# Patient Record
Sex: FEMALE | Race: WHITE | Employment: OTHER | URBAN - METROPOLITAN AREA
[De-identification: names, ages, dates, MRNs, and addresses within clinical notes are randomized per-mention and may not be internally consistent; named-entity substitution may affect disease eponyms.]

---

## 2018-08-23 ENCOUNTER — HOSPITAL ENCOUNTER (EMERGENCY)
Age: 68
Discharge: HOME | End: 2018-08-23
Attending: EMERGENCY MEDICINE
Payer: MEDICARE

## 2018-08-23 VITALS
BODY MASS INDEX: 28.63 KG/M2 | HEIGHT: 59 IN | RESPIRATION RATE: 16 BRPM | DIASTOLIC BLOOD PRESSURE: 76 MMHG | HEART RATE: 68 BPM | WEIGHT: 142 LBS | SYSTOLIC BLOOD PRESSURE: 143 MMHG | OXYGEN SATURATION: 96 % | TEMPERATURE: 97.2 F

## 2018-08-23 DIAGNOSIS — S29.012A STRAIN OF MID-BACK, INITIAL ENCOUNTER: ICD-10-CM

## 2018-08-23 DIAGNOSIS — N30.00 ACUTE CYSTITIS WITHOUT HEMATURIA: Primary | ICD-10-CM

## 2018-08-23 LAB
APPEARANCE UR: CLEAR
BACTERIA URNS QL MICRO: ABNORMAL /HPF
BILIRUB UR QL: NEGATIVE
COLOR UR: YELLOW
EPITH CASTS URNS QL MICRO: ABNORMAL /LPF (ref 0–5)
GLUCOSE UR STRIP.AUTO-MCNC: NEGATIVE MG/DL
HGB UR QL STRIP: NEGATIVE
KETONES UR QL STRIP.AUTO: ABNORMAL MG/DL
LEUKOCYTE ESTERASE UR QL STRIP.AUTO: ABNORMAL
NITRITE UR QL STRIP.AUTO: NEGATIVE
PH UR STRIP: 5.5 [PH] (ref 5–8)
PROT UR STRIP-MCNC: NEGATIVE MG/DL
RBC #/AREA URNS HPF: ABNORMAL /HPF (ref 0–5)
SP GR UR REFRACTOMETRY: 1.02 (ref 1–1.03)
UROBILINOGEN UR QL STRIP.AUTO: 0.2 EU/DL (ref 0.2–1)
WBC URNS QL MICRO: ABNORMAL /HPF (ref 0–4)

## 2018-08-23 PROCEDURE — 81001 URINALYSIS AUTO W/SCOPE: CPT | Performed by: EMERGENCY MEDICINE

## 2018-08-23 PROCEDURE — 99283 EMERGENCY DEPT VISIT LOW MDM: CPT

## 2018-08-23 RX ORDER — NITROFURANTOIN 25; 75 MG/1; MG/1
100 CAPSULE ORAL 2 TIMES DAILY
Qty: 10 CAP | Refills: 0 | Status: SHIPPED | OUTPATIENT
Start: 2018-08-23 | End: 2018-08-28

## 2018-08-23 RX ORDER — METHOCARBAMOL 500 MG/1
500 TABLET, FILM COATED ORAL
Qty: 12 TAB | Refills: 0 | Status: SHIPPED | OUTPATIENT
Start: 2018-08-23

## 2018-08-23 NOTE — ED PROVIDER NOTES
EMERGENCY DEPARTMENT HISTORY AND PHYSICAL EXAM    8:50 AM      Date: 8/23/2018  Patient Name: Alejandrina Zamora    History of Presenting Illness     Chief Complaint   Patient presents with    Back Pain         History Provided By: Patient    Chief Complaint: Back pain   Duration: 5 Days  Timing:  Constant  Location: mid back bilateral   Quality: Aching  Severity: Moderate  Modifying Factors: 2 Tylenol and Naproxen, with relief of Sx; exacerbated by standing and bending over trying to stand    Associated Symptoms: decreased urination       Additional History (Context): Alejandrina Zamora is a 77 y.o. female with PMHx of HTN and depression who presents with c/o moderate constant aching bilateral mid back pain that started 5 days ago with associated Sx of decreased urination. Pt states she is here visiting from [place], she has been here for 1 week and traveled here by train. Patient states she will be here visiting for another 2 weeks. She states she feels like she strained her back and when she feels a lot of pain she described the back pain like a toothache. Pt's pain is exacerbated by standing and bending over trying to stand up straight. Pt's has no back pain at rest and lying down flat. Pt took 2 Tylenol pills and a Naproxen this morning for her pain and she had some relief but she states she will only have relief until the medication wears off. Pt has a hx of a hip replacement surgery, infection in the hip, and MRSA. Pt states she is a previous tobacco user and quit smoking about 9 years ago and because of that she has constant SOB on exertion. Pt is worried her Sx may be because of a bladder infection. Pt's PCP in [place] is Dr. Rosendo Ellington. Pt denies injury or fall that may have caused the back pain. Pt denies dysuria, cloudy urine, numbness, trauma, abdominal pain, nausea, and vomiting. Denies any further complaints or symptoms at the moment.        PCP: Xochitl Yanez MD      Past History     Past Medical History:  Past Medical History:   Diagnosis Date    Depression     Hypertension        Past Surgical History:  History reviewed. No pertinent surgical history. Family History:  History reviewed. No pertinent family history. Social History:  Social History   Substance Use Topics    Smoking status: Former Smoker    Smokeless tobacco: Never Used    Alcohol use No       Allergies: Allergies   Allergen Reactions    Amoxicillin Hives    Codeine Nausea and Vomiting    Vancomycin Itching         Review of Systems       Review of Systems   Constitutional: Negative for fever. HENT: Negative for sore throat. Eyes: Negative for redness. Respiratory: Negative for shortness of breath and wheezing. Cardiovascular: Negative for chest pain. Gastrointestinal: Negative for abdominal pain, nausea and vomiting. Genitourinary: Positive for decreased urine volume. Negative for dysuria and hematuria. Negative for cloudy urine   Musculoskeletal: Positive for back pain. Negative for neck stiffness. Skin: Negative for pallor. Neurological: Negative for headaches. Hematological: Does not bruise/bleed easily. All other systems reviewed and are negative. Physical Exam     Visit Vitals    /76 (BP 1 Location: Left arm, BP Patient Position: At rest)    Pulse 68    Temp 97.2 °F (36.2 °C)    Resp 16    Ht 4' 11\" (1.499 m)    Wt 64.4 kg (142 lb)    SpO2 96%    BMI 28.68 kg/m2         Physical Exam   Constitutional: She is oriented to person, place, and time. She appears well-developed and well-nourished. No distress. HENT:   Head: Normocephalic and atraumatic. Mouth/Throat: Oropharynx is clear and moist.   Eyes: Conjunctivae are normal. Pupils are equal, round, and reactive to light. No scleral icterus. Neck: Normal range of motion. Neck supple. Cardiovascular: Normal rate and intact distal pulses.     Capillary refill < 3 seconds   Pulmonary/Chest: Effort normal and breath sounds normal. No respiratory distress. She has no wheezes. Abdominal: Soft. Bowel sounds are normal. She exhibits no distension. There is no tenderness. Musculoskeletal: Normal range of motion. She exhibits no edema. When stood up and bent forward a few time pt has pain in the latissmus dorsi area  No flank TTP   Lymphadenopathy:     She has no cervical adenopathy. Neurological: She is alert and oriented to person, place, and time. No cranial nerve deficit. Skin: Skin is warm and dry. She is not diaphoretic. Nursing note and vitals reviewed. Diagnostic Study Results     Labs -  Recent Results (from the past 12 hour(s))   URINALYSIS W/ RFLX MICROSCOPIC    Collection Time: 08/23/18  9:46 AM   Result Value Ref Range    Color YELLOW      Appearance CLEAR      Specific gravity 1.025 1.003 - 1.030      pH (UA) 5.5 5.0 - 8.0      Protein NEGATIVE  NEG mg/dL    Glucose NEGATIVE  NEG mg/dL    Ketone TRACE (A) NEG mg/dL    Bilirubin NEGATIVE  NEG      Blood NEGATIVE  NEG      Urobilinogen 0.2 0.2 - 1.0 EU/dL    Nitrites NEGATIVE  NEG      Leukocyte Esterase SMALL (A) NEG     URINE MICROSCOPIC ONLY    Collection Time: 08/23/18  9:46 AM   Result Value Ref Range    WBC 0 to 1 0 - 4 /hpf    RBC 0 to 1 0 - 5 /hpf    Epithelial cells FEW 0 - 5 /lpf    Bacteria 1+ (A) NEG /hpf       Radiologic Studies -   No orders to display         Medical Decision Making   I am the first provider for this patient. I reviewed the vital signs, available nursing notes, past medical history, past surgical history, family history and social history. Vital Signs-Reviewed the patient's vital signs. Pulse Oximetry Analysis -  96% on room air (Interpretation) normal    Records Reviewed: Nursing Notes (Time of Review: 8:50 AM)    Provider Notes (Medical Decision Making): DDx: back strain, spasm, UTI. No recent trauma, no new SOB, no chest pain, no flank tenderness, pt has decreased urination.  Will check urine, I don't feel any imaging is needed. Pt's pain is resolved at rest and worse when stood up and had certain movements. I doubt a kidney stone. MDM    Medications - No data to display    ED Course: Progress Notes, Reevaluation, and Consults:  +bacteria, small leuks in urine, no rbc. Pt symptomatic, will txt. No current back pain unless move certain way, likely a strain. I have reassessed the patient. I have discussed the workup, results and plan with the patient and patient is in agreement. Patient is feeling better. Patient will be prescribed macrobid, robaxin. Patient was discharge in stable condition. Patient was given outpatient follow up. Patient is to return to emergency department if any new or worsening condition. Diagnosis     Clinical Impression:   1. Acute cystitis without hematuria    2. Strain of mid-back, initial encounter        Disposition: discharged     Follow-up Information     Follow up With Details Comments Contact Info    [facility] EMERGENCY DEPT  As needed, If symptoms worsen [address] [place] [id number] [phone]    Your primary care provider Schedule an appointment as soon as possible for a visit             Patient's Medications   Start Taking    METHOCARBAMOL (ROBAXIN) 500 MG TABLET    Take 1 Tab by mouth two (2) times daily as needed. Indications: pain; muscle spasms    NITROFURANTOIN, MACROCRYSTAL-MONOHYDRATE, (MACROBID) 100 MG CAPSULE    Take 1 Cap by mouth two (2) times a day for 5 days.    Continue Taking    No medications on file   These Medications have changed    No medications on file   Stop Taking    No medications on file     _______________________________    Attestations:  Maite Kelly acting as a scribe for and in the presence of Emmanuel Tobar,       August 23, 2018 at 8:50 AM       Provider Attestation:      I personally performed the services described in the documentation, reviewed the documentation, as recorded by the scribe in my presence, and it accurately and completely records my words and actions.  August 23, 2018 at 8:50 NICOLE - Cralo Harden DO    _______________________________

## 2018-08-23 NOTE — DISCHARGE INSTRUCTIONS
Urinary Tract Infection in Women: Care Instructions  Your Care Instructions    A urinary tract infection, or UTI, is a general term for an infection anywhere between the kidneys and the urethra (where urine comes out). Most UTIs are bladder infections. They often cause pain or burning when you urinate. UTIs are caused by bacteria and can be cured with antibiotics. Be sure to complete your treatment so that the infection goes away. Follow-up care is a key part of your treatment and safety. Be sure to make and go to all appointments, and call your doctor if you are having problems. It's also a good idea to know your test results and keep a list of the medicines you take. How can you care for yourself at home? · Take your antibiotics as directed. Do not stop taking them just because you feel better. You need to take the full course of antibiotics. · Drink extra water and other fluids for the next day or two. This may help wash out the bacteria that are causing the infection. (If you have kidney, heart, or liver disease and have to limit fluids, talk with your doctor before you increase your fluid intake.)  · Avoid drinks that are carbonated or have caffeine. They can irritate the bladder. · Urinate often. Try to empty your bladder each time. · To relieve pain, take a hot bath or lay a heating pad set on low over your lower belly or genital area. Never go to sleep with a heating pad in place. To prevent UTIs  · Drink plenty of water each day. This helps you urinate often, which clears bacteria from your system. (If you have kidney, heart, or liver disease and have to limit fluids, talk with your doctor before you increase your fluid intake.)  · Urinate when you need to. · Urinate right after you have sex. · Change sanitary pads often. · Avoid douches, bubble baths, feminine hygiene sprays, and other feminine hygiene products that have deodorants.   · After going to the bathroom, wipe from front to back.  When should you call for help? Call your doctor now or seek immediate medical care if:    · Symptoms such as fever, chills, nausea, or vomiting get worse or appear for the first time.     · You have new pain in your back just below your rib cage. This is called flank pain.     · There is new blood or pus in your urine.     · You have any problems with your antibiotic medicine.    Watch closely for changes in your health, and be sure to contact your doctor if:    · You are not getting better after taking an antibiotic for 2 days.     · Your symptoms go away but then come back. Where can you learn more? Go to http://carole-rolando.info/. Enter P683 in the search box to learn more about \"Urinary Tract Infection in Women: Care Instructions. \"  Current as of: May 12, 2017  Content Version: 11.7  © 0419-0544 [employer]. Care instructions adapted under license by [employer] (which disclaims liability or warranty for this information). If you have questions about a medical condition or this instruction, always ask your healthcare professional. [address] any warranty or liability for your use of this information.

## 2018-08-23 NOTE — ED TRIAGE NOTES
The patient presents for evaluation of bilateral mid back pain x one week. Denies trauma or heavy lifting.

## 2024-01-14 ENCOUNTER — HOSPITAL ENCOUNTER (EMERGENCY)
Facility: HOSPITAL | Age: 74
Discharge: HOME | End: 2024-01-14
Payer: MEDICARE

## 2024-01-14 ENCOUNTER — APPOINTMENT (OUTPATIENT)
Facility: HOSPITAL | Age: 74
End: 2024-01-14
Payer: MEDICARE

## 2024-01-14 VITALS
RESPIRATION RATE: 18 BRPM | SYSTOLIC BLOOD PRESSURE: 130 MMHG | HEART RATE: 76 BPM | TEMPERATURE: 97.7 F | DIASTOLIC BLOOD PRESSURE: 73 MMHG | OXYGEN SATURATION: 94 %

## 2024-01-14 DIAGNOSIS — R19.7 DIARRHEA, UNSPECIFIED TYPE: ICD-10-CM

## 2024-01-14 DIAGNOSIS — N30.01 ACUTE CYSTITIS WITH HEMATURIA: Primary | ICD-10-CM

## 2024-01-14 LAB
ALBUMIN SERPL-MCNC: 4.4 G/DL (ref 3.4–5)
ALBUMIN/GLOB SERPL: 1 (ref 0.8–1.7)
ALP SERPL-CCNC: 119 U/L (ref 45–117)
ALT SERPL-CCNC: 24 U/L (ref 13–56)
ANION GAP SERPL CALC-SCNC: 5 MMOL/L (ref 3–18)
APPEARANCE UR: ABNORMAL
AST SERPL-CCNC: 28 U/L (ref 10–38)
BACTERIA URNS QL MICRO: ABNORMAL /HPF
BASOPHILS # BLD: 0.1 K/UL (ref 0–0.1)
BASOPHILS NFR BLD: 1 % (ref 0–2)
BILIRUB SERPL-MCNC: 1.4 MG/DL (ref 0.2–1)
BILIRUB UR QL: NEGATIVE
BUN SERPL-MCNC: 15 MG/DL (ref 7–18)
BUN/CREAT SERPL: 14 (ref 12–20)
CALCIUM SERPL-MCNC: 9.9 MG/DL (ref 8.5–10.1)
CHLORIDE SERPL-SCNC: 104 MMOL/L (ref 100–111)
CO2 SERPL-SCNC: 28 MMOL/L (ref 21–32)
COLOR UR: YELLOW
CREAT SERPL-MCNC: 1.09 MG/DL (ref 0.6–1.3)
DIFFERENTIAL METHOD BLD: ABNORMAL
EOSINOPHIL # BLD: 0.1 K/UL (ref 0–0.4)
EOSINOPHIL NFR BLD: 1 % (ref 0–5)
EPITH CASTS URNS QL MICRO: ABNORMAL /LPF (ref 0–5)
ERYTHROCYTE [DISTWIDTH] IN BLOOD BY AUTOMATED COUNT: 12.6 % (ref 11.6–14.5)
GLOBULIN SER CALC-MCNC: 4.4 G/DL (ref 2–4)
GLUCOSE SERPL-MCNC: 94 MG/DL (ref 74–99)
GLUCOSE UR STRIP.AUTO-MCNC: NEGATIVE MG/DL
HCT VFR BLD AUTO: 44.1 % (ref 35–45)
HGB BLD-MCNC: 14.7 G/DL (ref 12–16)
HGB UR QL STRIP: ABNORMAL
IMM GRANULOCYTES # BLD AUTO: 0 K/UL (ref 0–0.04)
IMM GRANULOCYTES NFR BLD AUTO: 0 % (ref 0–0.5)
KETONES UR QL STRIP.AUTO: NEGATIVE MG/DL
LEUKOCYTE ESTERASE UR QL STRIP.AUTO: ABNORMAL
LYMPHOCYTES # BLD: 1.3 K/UL (ref 0.9–3.6)
LYMPHOCYTES NFR BLD: 10 % (ref 21–52)
MCH RBC QN AUTO: 31 PG (ref 24–34)
MCHC RBC AUTO-ENTMCNC: 33.3 G/DL (ref 31–37)
MCV RBC AUTO: 93 FL (ref 78–100)
MONOCYTES # BLD: 0.9 K/UL (ref 0.05–1.2)
MONOCYTES NFR BLD: 7 % (ref 3–10)
NEUTS SEG # BLD: 11 K/UL (ref 1.8–8)
NEUTS SEG NFR BLD: 81 % (ref 40–73)
NITRITE UR QL STRIP.AUTO: POSITIVE
NRBC # BLD: 0 K/UL (ref 0–0.01)
NRBC BLD-RTO: 0 PER 100 WBC
PH UR STRIP: 5.5 (ref 5–8)
PLATELET # BLD AUTO: 210 K/UL (ref 135–420)
PMV BLD AUTO: 9.3 FL (ref 9.2–11.8)
POTASSIUM SERPL-SCNC: 4.2 MMOL/L (ref 3.5–5.5)
PROT SERPL-MCNC: 8.8 G/DL (ref 6.4–8.2)
PROT UR STRIP-MCNC: 30 MG/DL
RBC # BLD AUTO: 4.74 M/UL (ref 4.2–5.3)
RBC #/AREA URNS HPF: ABNORMAL /HPF (ref 0–5)
SODIUM SERPL-SCNC: 137 MMOL/L (ref 136–145)
SP GR UR REFRACTOMETRY: >1.03 (ref 1–1.03)
UROBILINOGEN UR QL STRIP.AUTO: 1 EU/DL (ref 0.2–1)
WBC # BLD AUTO: 13.5 K/UL (ref 4.6–13.2)
WBC URNS QL MICRO: ABNORMAL /HPF (ref 0–4)

## 2024-01-14 PROCEDURE — 85025 COMPLETE CBC W/AUTO DIFF WBC: CPT

## 2024-01-14 PROCEDURE — 81001 URINALYSIS AUTO W/SCOPE: CPT

## 2024-01-14 PROCEDURE — 6360000004 HC RX CONTRAST MEDICATION

## 2024-01-14 PROCEDURE — 87186 SC STD MICRODIL/AGAR DIL: CPT

## 2024-01-14 PROCEDURE — 87086 URINE CULTURE/COLONY COUNT: CPT

## 2024-01-14 PROCEDURE — 99285 EMERGENCY DEPT VISIT HI MDM: CPT

## 2024-01-14 PROCEDURE — 87077 CULTURE AEROBIC IDENTIFY: CPT

## 2024-01-14 PROCEDURE — 74177 CT ABD & PELVIS W/CONTRAST: CPT

## 2024-01-14 PROCEDURE — 80053 COMPREHEN METABOLIC PANEL: CPT

## 2024-01-14 RX ORDER — NITROFURANTOIN 25; 75 MG/1; MG/1
100 CAPSULE ORAL 2 TIMES DAILY
Qty: 14 CAPSULE | Refills: 0 | Status: SHIPPED | OUTPATIENT
Start: 2024-01-14 | End: 2024-01-15

## 2024-01-14 RX ADMIN — IOPAMIDOL 80 ML: 612 INJECTION, SOLUTION INTRAVENOUS at 14:31

## 2024-01-14 NOTE — ED TRIAGE NOTES
Pt arrived in ER complaining of diarrhea and painful urination. Hx of CAD, HTN, and high cholesterol.

## 2024-01-14 NOTE — ED PROVIDER NOTES
Well , 15-17 Years Old  Well-child exams are recommended visits with a health care provider to track your growth and development at certain ages. This sheet tells you what to expect during this visit.  Recommended immunizations  · Tetanus and diphtheria toxoids and acellular pertussis (Tdap) vaccine.  ? Adolescents aged 11-18 years who are not fully immunized with diphtheria and tetanus toxoids and acellular pertussis (DTaP) or have not received a dose of Tdap should:  ? Receive a dose of Tdap vaccine. It does not matter how long ago the last dose of tetanus and diphtheria toxoid-containing vaccine was given.  ? Receive a tetanus diphtheria (Td) vaccine once every 10 years after receiving the Tdap dose.  ? Pregnant adolescents should be given 1 dose of the Tdap vaccine during each pregnancy, between weeks 27 and 36 of pregnancy.  · You may get doses of the following vaccines if needed to catch up on missed doses:  ? Hepatitis B vaccine. Children or teenagers aged 11-15 years may receive a 2-dose series. The second dose in a 2-dose series should be given 4 months after the first dose.  ? Inactivated poliovirus vaccine.  ? Measles, mumps, and rubella (MMR) vaccine.  ? Varicella vaccine.  ? Human papillomavirus (HPV) vaccine.  · You may get doses of the following vaccines if you have certain high-risk conditions:  ? Pneumococcal conjugate (PCV13) vaccine.  ? Pneumococcal polysaccharide (PPSV23) vaccine.  · Influenza vaccine (flu shot). A yearly (annual) flu shot is recommended.  · Hepatitis A vaccine. A teenager who did not receive the vaccine before 2 years of age should be given the vaccine only if he or she is at risk for infection or if hepatitis A protection is desired.  · Meningococcal conjugate vaccine. A booster should be given at 16 years of age.  ? Doses should be given, if needed, to catch up on missed doses. Adolescents aged 11-18 years who have certain high-risk conditions should receive 2  doses. Those doses should be given at least 8 weeks apart.  ? Teens and young adults 16-23 years old may also be vaccinated with a serogroup B meningococcal vaccine.  Testing  Your health care provider may talk with you privately, without parents present, for at least part of the well-child exam. This may help you to become more open about sexual behavior, substance use, risky behaviors, and depression. If any of these areas raises a concern, you may have more testing to make a diagnosis. Talk with your health care provider about the need for certain screenings.  Vision  · Have your vision checked every 2 years, as long as you do not have symptoms of vision problems. Finding and treating eye problems early is important.  · If an eye problem is found, you may need to have an eye exam every year (instead of every 2 years). You may also need to visit an eye specialist.  Hepatitis B  · If you are at high risk for hepatitis B, you should be screened for this virus. You may be at high risk if:  ? You were born in a country where hepatitis B occurs often, especially if you did not receive the hepatitis B vaccine. Talk with your health care provider about which countries are considered high-risk.  ? One or both of your parents was born in a high-risk country and you have not received the hepatitis B vaccine.  ? You have HIV or AIDS (acquired immunodeficiency syndrome).  ? You use needles to inject street drugs.  ? You live with or have sex with someone who has hepatitis B.  ? You are male and you have sex with other males (MSM).  ? You receive hemodialysis treatment.  ? You take certain medicines for conditions like cancer, organ transplantation, or autoimmune conditions.  If you are sexually active:  · You may be screened for certain STDs (sexually transmitted diseases), such as:  ? Chlamydia.  ? Gonorrhea (females only).  ? Syphilis.  · If you are a female, you may also be screened for pregnancy.  If you are  Eosinophils % 1 0 - 5 %    Basophils % 1 0 - 2 %    Immature Granulocytes 0 0.0 - 0.5 %    Neutrophils Absolute 11.0 (H) 1.8 - 8.0 K/UL    Lymphocytes Absolute 1.3 0.9 - 3.6 K/UL    Monocytes Absolute 0.9 0.05 - 1.2 K/UL    Eosinophils Absolute 0.1 0.0 - 0.4 K/UL    Basophils Absolute 0.1 0.0 - 0.1 K/UL    Absolute Immature Granulocyte 0.0 0.00 - 0.04 K/UL    Differential Type AUTOMATED     Comprehensive Metabolic Panel    Collection Time: 01/14/24  1:13 PM   Result Value Ref Range    Sodium 137 136 - 145 mmol/L    Potassium 4.2 3.5 - 5.5 mmol/L    Chloride 104 100 - 111 mmol/L    CO2 28 21 - 32 mmol/L    Anion Gap 5 3.0 - 18 mmol/L    Glucose 94 74 - 99 mg/dL    BUN 15 7.0 - 18 MG/DL    Creatinine 1.09 0.6 - 1.3 MG/DL    Bun/Cre Ratio 14 12 - 20      Est, Glom Filt Rate 53 (L) >60 ml/min/1.73m2    Calcium 9.9 8.5 - 10.1 MG/DL    Total Bilirubin 1.4 (H) 0.2 - 1.0 MG/DL    ALT 24 13 - 56 U/L    AST 28 10 - 38 U/L    Alk Phosphatase 119 (H) 45 - 117 U/L    Total Protein 8.8 (H) 6.4 - 8.2 g/dL    Albumin 4.4 3.4 - 5.0 g/dL    Globulin 4.4 (H) 2.0 - 4.0 g/dL    Albumin/Globulin Ratio 1.0 0.8 - 1.7     Urinalysis    Collection Time: 01/14/24  2:53 PM   Result Value Ref Range    Color, UA YELLOW      Appearance CLOUDY      Specific Gravity, UA >1.030 (H) 1.003 - 1.030    pH, Urine 5.5 5.0 - 8.0      Protein, UA 30 (A) NEG mg/dL    Glucose, UA Negative NEG mg/dL    Ketones, Urine Negative NEG mg/dL    Bilirubin Urine Negative NEG      Blood, Urine LARGE (A) NEG      Urobilinogen, Urine 1.0 0.2 - 1.0 EU/dL    Nitrite, Urine Positive (A) NEG      Leukocyte Esterase, Urine LARGE (A) NEG     Urinalysis, Micro    Collection Time: 01/14/24  2:53 PM   Result Value Ref Range    WBC, UA TOO NUMEROUS TO COUNT 0 - 4 /hpf    RBC, UA 10 to 15 0 - 5 /hpf    Epithelial Cells UA 1+ 0 - 5 /lpf    BACTERIA, URINE 4+ (A) NEG /hpf       Radiologic Studies:   CT ABDOMEN PELVIS W IV CONTRAST Additional Contrast? Radiologist Recommendation  female:  · Your health care provider may ask:  ? Whether you have begun menstruating.  ? The start date of your last menstrual cycle.  ? The typical length of your menstrual cycle.  · Depending on your risk factors, you may be screened for cancer of the lower part of your uterus (cervix).  ? In most cases, you should have your first Pap test when you turn 21 years old. A Pap test, sometimes called a pap smear, is a screening test that is used to check for signs of cancer of the vagina, cervix, and uterus.  ? If you have medical problems that raise your chance of getting cervical cancer, your health care provider may recommend cervical cancer screening before age 21.  Other tests    · You will be screened for:  ? Vision and hearing problems.  ? Alcohol and drug use.  ? High blood pressure.  ? Scoliosis.  ? HIV.  · You should have your blood pressure checked at least once a year.  · Depending on your risk factors, your health care provider may also screen for:  ? Low red blood cell count (anemia).  ? Lead poisoning.  ? Tuberculosis (TB).  ? Depression.  ? High blood sugar (glucose).  · Your health care provider will measure your BMI (body mass index) every year to screen for obesity. BMI is an estimate of body fat and is calculated from your height and weight.  General instructions  Talking with your parents    · Allow your parents to be actively involved in your life. You may start to depend more on your peers for information and support, but your parents can still help you make safe and healthy decisions.  · Talk with your parents about:  ? Body image. Discuss any concerns you have about your weight, your eating habits, or eating disorders.  ? Bullying. If you are being bullied or you feel unsafe, tell your parents or another trusted adult.  ? Handling conflict without physical violence.  ? Dating and sexuality. You should never put yourself in or stay in a situation that makes you feel uncomfortable. If you do not  want to engage in sexual activity, tell your partner no.  ? Your social life and how things are going at school. It is easier for your parents to keep you safe if they know your friends and your friends' parents.  · Follow any rules about curfew and chores in your household.  · If you feel sylvester, depressed, anxious, or if you have problems paying attention, talk with your parents, your health care provider, or another trusted adult. Teenagers are at risk for developing depression or anxiety.  Oral health    · Brush your teeth twice a day and floss daily.  · Get a dental exam twice a year.  Skin care  · If you have acne that causes concern, contact your health care provider.  Sleep  · Get 8.5-9.5 hours of sleep each night. It is common for teenagers to stay up late and have trouble getting up in the morning. Lack of sleep can cause many problems, including difficulty concentrating in class or staying alert while driving.  · To make sure you get enough sleep:  ? Avoid screen time right before bedtime, including watching TV.  ? Practice relaxing nighttime habits, such as reading before bedtime.  ? Avoid caffeine before bedtime.  ? Avoid exercising during the 3 hours before bedtime. However, exercising earlier in the evening can help you sleep better.  What's next?  Visit a pediatrician yearly.  Summary  · Your health care provider may talk with you privately, without parents present, for at least part of the well-child exam.  · To make sure you get enough sleep, avoid screen time and caffeine before bedtime, and exercise more than 3 hours before you go to bed.  · If you have acne that causes concern, contact your health care provider.  · Allow your parents to be actively involved in your life. You may start to depend more on your peers for information and support, but your parents can still help you make safe and healthy decisions.  This information is not intended to replace advice given to you by your health care  provider. Make sure you discuss any questions you have with your health care provider.  Document Released: 03/14/2008 Document Revised: 04/07/2020 Document Reviewed: 07/27/2018  Elsevier Patient Education © 2020 [employer]

## 2024-01-15 ENCOUNTER — HOSPITAL ENCOUNTER (EMERGENCY)
Facility: HOSPITAL | Age: 74
Discharge: HOME | End: 2024-01-15
Payer: MEDICARE

## 2024-01-15 VITALS
SYSTOLIC BLOOD PRESSURE: 127 MMHG | TEMPERATURE: 97.2 F | HEART RATE: 76 BPM | RESPIRATION RATE: 16 BRPM | DIASTOLIC BLOOD PRESSURE: 53 MMHG | OXYGEN SATURATION: 96 % | BODY MASS INDEX: 28.97 KG/M2 | WEIGHT: 138 LBS | HEIGHT: 58 IN

## 2024-01-15 DIAGNOSIS — T50.905A ADVERSE EFFECT OF DRUG, INITIAL ENCOUNTER: Primary | ICD-10-CM

## 2024-01-15 PROCEDURE — 99283 EMERGENCY DEPT VISIT LOW MDM: CPT

## 2024-01-15 PROCEDURE — 6370000000 HC RX 637 (ALT 250 FOR IP)

## 2024-01-15 RX ORDER — CEPHALEXIN 250 MG/1
500 CAPSULE ORAL EVERY 12 HOURS SCHEDULED
Status: DISCONTINUED | OUTPATIENT
Start: 2024-01-15 | End: 2024-01-15 | Stop reason: HOSPADM

## 2024-01-15 RX ORDER — CEPHALEXIN 500 MG/1
500 CAPSULE ORAL 3 TIMES DAILY
Qty: 21 CAPSULE | Refills: 0 | Status: SHIPPED | OUTPATIENT
Start: 2024-01-15 | End: 2024-01-22

## 2024-01-15 RX ORDER — SULFAMETHOXAZOLE AND TRIMETHOPRIM 800; 160 MG/1; MG/1
1 TABLET ORAL EVERY 12 HOURS SCHEDULED
Status: DISCONTINUED | OUTPATIENT
Start: 2024-01-15 | End: 2024-01-15

## 2024-01-15 RX ADMIN — CEPHALEXIN 500 MG: 250 CAPSULE ORAL at 09:20

## 2024-01-15 ASSESSMENT — PAIN - FUNCTIONAL ASSESSMENT: PAIN_FUNCTIONAL_ASSESSMENT: NONE - DENIES PAIN

## 2024-01-15 NOTE — ED PROVIDER NOTES
[facility] EMERGENCY DEPT  EMERGENCY DEPARTMENT ENCOUNTER    Patient Name: Annette Sanchez  MRN: [id number]  YOB: 1950  Provider: Sylvester Marcos DO  PCP: No primary care provider on file.   Time/Date of evaluation: 8:05 AM EST on 1/15/24    History of Presenting Illness     History Provided by: Patient  History is limited by: Nothing     HISTORY:   Annette Sanchez is a 73 y.o. female with past medical history of hypertension, CAD, and hyperlipidemia who was prescribed nitrofurantoin 100mg in ED here yesterday for uncomplicated cystitis and has presented again today with chief complaint of pruritus that began at 0300 after taking medication.  Patient states that pruritus has now resolved but is concerned for allergy to medication.  The patient denies abdominal pain, nausea, throat swelling, cough, wheeze, shortness of breath, rash, or other associated symptoms.  Notes further history of allergic reactions to penicillins (anaphylaxis), vancomycin (pruritus), codeine (pruritus).    She does disclose improving diarrhea, which she clarifies is 2-3 episodes of \"loose\" stools that were not secondary to antibiotic medication, as diarrhea has been present for 1 week following stress test. She reports previous history of similar symptoms following a prior stress test as well.     Nursing Notes were all reviewed and agreed with or any disagreements were addressed in the HPI.    Past History     PAST MEDICAL HISTORY:  Hypertension  CAD  Hyperlipidemia    PAST SURGICAL HISTORY:  Cholecystectomy    FAMILY HISTORY:  Not disclosed    SOCIAL HISTORY:       MEDICATIONS:  Current Facility-Administered Medications   Medication Dose Route Frequency Provider Last Rate Last Admin    sulfamethoxazole-trimethoprim (BACTRIM DS;SEPTRA DS) 800-160 MG per tablet 1 tablet  1 tablet Oral 2 times per day Sylvester Marcos DO        cephALEXin (KEFLEX) capsule 500 mg  500 mg Oral 2 times per day Sylvester Marcos DO         No current

## 2024-01-15 NOTE — ED TRIAGE NOTES
Pt ambulatory to triage c/o itching and diarrhea after being prescribe macrobid for UTI. Pt would like a different medication to treat her UTI. Pt was here yesterday and treated.

## 2024-01-16 LAB
BACTERIA SPEC CULT: ABNORMAL
CC UR VC: ABNORMAL
SERVICE CMNT-IMP: ABNORMAL

## 2025-05-27 ENCOUNTER — APPOINTMENT (OUTPATIENT)
Facility: HOSPITAL | Age: 75
DRG: 313 | End: 2025-05-27
Payer: MEDICARE

## 2025-05-27 ENCOUNTER — HOSPITAL ENCOUNTER (INPATIENT)
Facility: HOSPITAL | Age: 75
LOS: 1 days | Discharge: HOME | DRG: 313 | End: 2025-05-28
Attending: INTERNAL MEDICINE | Admitting: INTERNAL MEDICINE
Payer: MEDICARE

## 2025-05-27 DIAGNOSIS — R79.89 ELEVATED TROPONIN: ICD-10-CM

## 2025-05-27 DIAGNOSIS — R07.9 CHEST PAIN, UNSPECIFIED TYPE: Primary | ICD-10-CM

## 2025-05-27 LAB
ALBUMIN SERPL-MCNC: 4.2 G/DL (ref 3.4–5)
ALBUMIN/GLOB SERPL: 1.4 (ref 0.8–1.7)
ALP SERPL-CCNC: 66 U/L (ref 45–117)
ALT SERPL-CCNC: 23 U/L (ref 10–35)
ANION GAP SERPL CALC-SCNC: 12 MMOL/L (ref 3–18)
APPEARANCE UR: CLEAR
APTT PPP: 28.4 SEC (ref 23–36.4)
AST SERPL-CCNC: 38 U/L (ref 10–38)
BACTERIA URNS QL MICRO: ABNORMAL /HPF
BASOPHILS # BLD: 0.06 K/UL (ref 0–0.1)
BASOPHILS NFR BLD: 0.7 % (ref 0–2)
BILIRUB SERPL-MCNC: 0.5 MG/DL (ref 0.2–1)
BILIRUB UR QL: NEGATIVE
BUN SERPL-MCNC: 17 MG/DL (ref 6–23)
BUN/CREAT SERPL: 14 (ref 12–20)
CALCIUM SERPL-MCNC: 10.1 MG/DL (ref 8.5–10.1)
CHLORIDE SERPL-SCNC: 103 MMOL/L (ref 98–107)
CO2 SERPL-SCNC: 25 MMOL/L (ref 21–32)
COLOR UR: YELLOW
CREAT SERPL-MCNC: 1.14 MG/DL (ref 0.6–1.3)
D DIMER PPP FEU-MCNC: 0.8 UG/ML(FEU)
DIFFERENTIAL METHOD BLD: NORMAL
EOSINOPHIL # BLD: 0.17 K/UL (ref 0–0.4)
EOSINOPHIL NFR BLD: 1.9 % (ref 0–5)
EPITH CASTS URNS QL MICRO: ABNORMAL /LPF (ref 0–5)
ERYTHROCYTE [DISTWIDTH] IN BLOOD BY AUTOMATED COUNT: 12.8 % (ref 11.6–14.5)
GLOBULIN SER CALC-MCNC: 3 G/DL (ref 2–4)
GLUCOSE SERPL-MCNC: 87 MG/DL (ref 74–108)
GLUCOSE UR STRIP.AUTO-MCNC: NEGATIVE MG/DL
HCT VFR BLD AUTO: 42.1 % (ref 35–45)
HGB BLD-MCNC: 13.9 G/DL (ref 12–16)
HGB UR QL STRIP: NEGATIVE
IMM GRANULOCYTES # BLD AUTO: 0.02 K/UL (ref 0–0.04)
IMM GRANULOCYTES NFR BLD AUTO: 0.2 % (ref 0–0.5)
INR PPP: 0.9 (ref 0.9–1.1)
KETONES UR QL STRIP.AUTO: NEGATIVE MG/DL
LEUKOCYTE ESTERASE UR QL STRIP.AUTO: ABNORMAL
LYMPHOCYTES # BLD: 1.94 K/UL (ref 0.9–3.6)
LYMPHOCYTES NFR BLD: 21.7 % (ref 21–52)
MAGNESIUM SERPL-MCNC: 2.2 MG/DL (ref 1.6–2.6)
MCH RBC QN AUTO: 30.8 PG (ref 24–34)
MCHC RBC AUTO-ENTMCNC: 33 G/DL (ref 31–37)
MCV RBC AUTO: 93.3 FL (ref 78–100)
MONOCYTES # BLD: 0.67 K/UL (ref 0.05–1.2)
MONOCYTES NFR BLD: 7.5 % (ref 3–10)
NEUTS SEG # BLD: 6.07 K/UL (ref 1.8–8)
NEUTS SEG NFR BLD: 68 % (ref 40–73)
NITRITE UR QL STRIP.AUTO: NEGATIVE
NRBC # BLD: 0 K/UL (ref 0–0.01)
NRBC BLD-RTO: 0 PER 100 WBC
PH UR STRIP: 6 (ref 5–8)
PLATELET # BLD AUTO: 167 K/UL (ref 135–420)
PMV BLD AUTO: 10.5 FL (ref 9.2–11.8)
POTASSIUM SERPL-SCNC: 4.3 MMOL/L (ref 3.5–5.5)
PROT SERPL-MCNC: 7.2 G/DL (ref 6.4–8.2)
PROT UR STRIP-MCNC: NEGATIVE MG/DL
PROTHROMBIN TIME: 12.8 SEC (ref 11.9–14.9)
RBC # BLD AUTO: 4.51 M/UL (ref 4.2–5.3)
RBC #/AREA URNS HPF: NEGATIVE /HPF (ref 0–5)
SODIUM SERPL-SCNC: 140 MMOL/L (ref 136–145)
SP GR UR REFRACTOMETRY: 1.01 (ref 1–1.03)
TROPONIN T SERPL HS-MCNC: 17.4 NG/L (ref 0–14)
TROPONIN T SERPL HS-MCNC: 20.8 NG/L (ref 0–14)
UROBILINOGEN UR QL STRIP.AUTO: 1 EU/DL (ref 0.2–1)
WBC # BLD AUTO: 8.9 K/UL (ref 4.6–13.2)
WBC URNS QL MICRO: ABNORMAL /HPF (ref 0–5)

## 2025-05-27 PROCEDURE — 71045 X-RAY EXAM CHEST 1 VIEW: CPT

## 2025-05-27 PROCEDURE — 93005 ELECTROCARDIOGRAM TRACING: CPT | Performed by: INTERNAL MEDICINE

## 2025-05-27 PROCEDURE — 93005 ELECTROCARDIOGRAM TRACING: CPT | Performed by: EMERGENCY MEDICINE

## 2025-05-27 PROCEDURE — 99285 EMERGENCY DEPT VISIT HI MDM: CPT

## 2025-05-27 PROCEDURE — 85025 COMPLETE CBC W/AUTO DIFF WBC: CPT

## 2025-05-27 PROCEDURE — 83735 ASSAY OF MAGNESIUM: CPT

## 2025-05-27 PROCEDURE — 6370000000 HC RX 637 (ALT 250 FOR IP)

## 2025-05-27 PROCEDURE — 6360000004 HC RX CONTRAST MEDICATION

## 2025-05-27 PROCEDURE — 84484 ASSAY OF TROPONIN QUANT: CPT

## 2025-05-27 PROCEDURE — 81001 URINALYSIS AUTO W/SCOPE: CPT

## 2025-05-27 PROCEDURE — 85730 THROMBOPLASTIN TIME PARTIAL: CPT

## 2025-05-27 PROCEDURE — 87086 URINE CULTURE/COLONY COUNT: CPT

## 2025-05-27 PROCEDURE — 85379 FIBRIN DEGRADATION QUANT: CPT

## 2025-05-27 PROCEDURE — 71275 CT ANGIOGRAPHY CHEST: CPT

## 2025-05-27 PROCEDURE — 85610 PROTHROMBIN TIME: CPT

## 2025-05-27 PROCEDURE — 80053 COMPREHEN METABOLIC PANEL: CPT

## 2025-05-27 RX ORDER — NITROGLYCERIN 0.4 MG/1
0.4 TABLET SUBLINGUAL
Status: COMPLETED | OUTPATIENT
Start: 2025-05-27 | End: 2025-05-27

## 2025-05-27 RX ORDER — HEPARIN SODIUM 1000 [USP'U]/ML
60 INJECTION, SOLUTION INTRAVENOUS; SUBCUTANEOUS PRN
Status: DISCONTINUED | OUTPATIENT
Start: 2025-05-27 | End: 2025-05-28

## 2025-05-27 RX ORDER — HEPARIN SODIUM 10000 [USP'U]/100ML
5-30 INJECTION, SOLUTION INTRAVENOUS CONTINUOUS
Status: DISCONTINUED | OUTPATIENT
Start: 2025-05-28 | End: 2025-05-28

## 2025-05-27 RX ORDER — IOPAMIDOL 755 MG/ML
90 INJECTION, SOLUTION INTRAVASCULAR
Status: COMPLETED | OUTPATIENT
Start: 2025-05-27 | End: 2025-05-27

## 2025-05-27 RX ORDER — HEPARIN SODIUM 1000 [USP'U]/ML
60 INJECTION, SOLUTION INTRAVENOUS; SUBCUTANEOUS ONCE
Status: COMPLETED | OUTPATIENT
Start: 2025-05-28 | End: 2025-05-28

## 2025-05-27 RX ORDER — HEPARIN SODIUM 1000 [USP'U]/ML
30 INJECTION, SOLUTION INTRAVENOUS; SUBCUTANEOUS PRN
Status: DISCONTINUED | OUTPATIENT
Start: 2025-05-27 | End: 2025-05-28

## 2025-05-27 RX ADMIN — NITROGLYCERIN 0.4 MG: 0.4 TABLET SUBLINGUAL at 23:24

## 2025-05-27 RX ADMIN — IOPAMIDOL 90 ML: 755 INJECTION, SOLUTION INTRAVENOUS at 22:59

## 2025-05-27 ASSESSMENT — PAIN - FUNCTIONAL ASSESSMENT: PAIN_FUNCTIONAL_ASSESSMENT: NONE - DENIES PAIN

## 2025-05-28 ENCOUNTER — APPOINTMENT (OUTPATIENT)
Facility: HOSPITAL | Age: 75
DRG: 313 | End: 2025-05-28
Payer: MEDICARE

## 2025-05-28 VITALS
DIASTOLIC BLOOD PRESSURE: 22 MMHG | HEIGHT: 58 IN | HEART RATE: 73 BPM | BODY MASS INDEX: 29.81 KG/M2 | TEMPERATURE: 98.1 F | RESPIRATION RATE: 20 BRPM | OXYGEN SATURATION: 96 % | SYSTOLIC BLOOD PRESSURE: 139 MMHG | WEIGHT: 142 LBS

## 2025-05-28 PROBLEM — I10 HYPERTENSION: Status: ACTIVE | Noted: 2025-05-28

## 2025-05-28 PROBLEM — R07.9 CHEST PAIN: Status: ACTIVE | Noted: 2025-05-28

## 2025-05-28 PROBLEM — I20.0 UNSTABLE ANGINA (HCC): Status: ACTIVE | Noted: 2025-05-28

## 2025-05-28 LAB
ANION GAP SERPL CALC-SCNC: 11 MMOL/L (ref 3–18)
APTT PPP: 66.6 SEC (ref 23–36.4)
APTT PPP: 70.1 SEC (ref 23–36.4)
BUN SERPL-MCNC: 16 MG/DL (ref 6–23)
BUN/CREAT SERPL: 15 (ref 12–20)
CALCIUM SERPL-MCNC: 9.3 MG/DL (ref 8.5–10.1)
CHLORIDE SERPL-SCNC: 102 MMOL/L (ref 98–107)
CO2 SERPL-SCNC: 24 MMOL/L (ref 21–32)
CREAT SERPL-MCNC: 1.07 MG/DL (ref 0.6–1.3)
ECHO AO ASC DIAM: 2.9 CM
ECHO AO ASCENDING AORTA INDEX: 1.85 CM/M2
ECHO AO ROOT DIAM: 3 CM
ECHO AO ROOT INDEX: 1.91 CM/M2
ECHO AR MAX VEL PISA: 4.6 M/S
ECHO AV AREA PEAK VELOCITY: 0.9 CM2
ECHO AV AREA VTI: 1 CM2
ECHO AV AREA/BSA PEAK VELOCITY: 0.6 CM2/M2
ECHO AV AREA/BSA VTI: 0.6 CM2/M2
ECHO AV MEAN GRADIENT: 21 MMHG
ECHO AV MEAN VELOCITY: 2.1 M/S
ECHO AV PEAK GRADIENT: 41 MMHG
ECHO AV PEAK VELOCITY: 3.2 M/S
ECHO AV REGURGITANT PHT: 312.5 MS
ECHO AV VELOCITY RATIO: 0.31
ECHO AV VTI: 66 CM
ECHO BSA: 1.62 M2
ECHO BSA: 1.62 M2
ECHO EST RA PRESSURE: 8 MMHG
ECHO LA VOL A-L A2C: 41 ML (ref 22–52)
ECHO LA VOL A-L A4C: 48 ML (ref 22–52)
ECHO LA VOL BP: 43 ML (ref 22–52)
ECHO LA VOL MOD A2C: 38 ML (ref 22–52)
ECHO LA VOL MOD A4C: 42 ML (ref 22–52)
ECHO LA VOL/BSA BIPLANE: 27 ML/M2 (ref 16–34)
ECHO LA VOLUME AREA LENGTH: 47 ML
ECHO LA VOLUME INDEX A-L A2C: 26 ML/M2 (ref 16–34)
ECHO LA VOLUME INDEX A-L A4C: 31 ML/M2 (ref 16–34)
ECHO LA VOLUME INDEX AREA LENGTH: 30 ML/M2 (ref 16–34)
ECHO LA VOLUME INDEX MOD A2C: 24 ML/M2 (ref 16–34)
ECHO LA VOLUME INDEX MOD A4C: 27 ML/M2 (ref 16–34)
ECHO LV E' LATERAL VELOCITY: 6.85 CM/S
ECHO LV E' SEPTAL VELOCITY: 5.89 CM/S
ECHO LV EDV A2C: 55 ML
ECHO LV EDV A4C: 91 ML
ECHO LV EDV BP: 73 ML (ref 56–104)
ECHO LV EDV INDEX A4C: 58 ML/M2
ECHO LV EDV INDEX BP: 46 ML/M2
ECHO LV EDV NDEX A2C: 35 ML/M2
ECHO LV EF PHYSICIAN: 55 %
ECHO LV EJECTION FRACTION A2C: 57 %
ECHO LV EJECTION FRACTION A4C: 54 %
ECHO LV EJECTION FRACTION BIPLANE: 57 % (ref 55–100)
ECHO LV ESV A2C: 24 ML
ECHO LV ESV A4C: 42 ML
ECHO LV ESV BP: 32 ML (ref 19–49)
ECHO LV ESV INDEX A2C: 15 ML/M2
ECHO LV ESV INDEX A4C: 27 ML/M2
ECHO LV ESV INDEX BP: 20 ML/M2
ECHO LV FRACTIONAL SHORTENING: 32 % (ref 28–44)
ECHO LV INTERNAL DIMENSION DIASTOLE INDEX: 2.61 CM/M2
ECHO LV INTERNAL DIMENSION DIASTOLIC: 4.1 CM (ref 3.9–5.3)
ECHO LV INTERNAL DIMENSION SYSTOLIC INDEX: 1.78 CM/M2
ECHO LV INTERNAL DIMENSION SYSTOLIC: 2.8 CM
ECHO LV IVSD: 1.1 CM (ref 0.6–0.9)
ECHO LV MASS 2D: 141.5 G (ref 67–162)
ECHO LV MASS INDEX 2D: 90.2 G/M2 (ref 43–95)
ECHO LV POSTERIOR WALL DIASTOLIC: 1 CM (ref 0.6–0.9)
ECHO LV RELATIVE WALL THICKNESS RATIO: 0.49
ECHO LVOT AREA: 3.1 CM2
ECHO LVOT AV VTI INDEX: 0.34
ECHO LVOT DIAM: 2 CM
ECHO LVOT MEAN GRADIENT: 2 MMHG
ECHO LVOT PEAK GRADIENT: 4 MMHG
ECHO LVOT PEAK VELOCITY: 1 M/S
ECHO LVOT STROKE VOLUME INDEX: 44.4 ML/M2
ECHO LVOT SV: 69.7 ML
ECHO LVOT VTI: 22.2 CM
ECHO MV A VELOCITY: 1.25 M/S
ECHO MV AREA VTI: 2 CM2
ECHO MV E DECELERATION TIME (DT): 242.5 MS
ECHO MV E VELOCITY: 0.88 M/S
ECHO MV E/A RATIO: 0.7
ECHO MV E/E' LATERAL: 12.85
ECHO MV E/E' RATIO (AVERAGED): 13.89
ECHO MV E/E' SEPTAL: 14.94
ECHO MV LVOT VTI INDEX: 1.59
ECHO MV MAX VELOCITY: 1.4 M/S
ECHO MV MEAN GRADIENT: 2 MMHG
ECHO MV MEAN VELOCITY: 0.7 M/S
ECHO MV PEAK GRADIENT: 8 MMHG
ECHO MV VTI: 35.4 CM
ECHO PV MAX VELOCITY: 0.9 M/S
ECHO PV PEAK GRADIENT: 4 MMHG
ECHO RA AREA 4C: 13.6 CM2
ECHO RA END SYSTOLIC VOLUME APICAL 4 CHAMBER INDEX BSA: 18 ML/M2
ECHO RA VOLUME AREA LENGTH APICAL 4 CHAMBER: 28 ML
ECHO RA VOLUME: 28 ML
ECHO RIGHT VENTRICULAR SYSTOLIC PRESSURE (RVSP): 30 MMHG
ECHO RV BASAL DIMENSION: 3 CM
ECHO RV TAPSE: 1.4 CM (ref 1.7–?)
ECHO TV REGURGITANT MAX VELOCITY: 2.33 M/S
ECHO TV REGURGITANT PEAK GRADIENT: 22 MMHG
GLUCOSE SERPL-MCNC: 90 MG/DL (ref 74–108)
LV EF: 58 ML
NUC STRESS EJECTION FRACTION: 70 %
POTASSIUM SERPL-SCNC: 4.3 MMOL/L (ref 3.5–5.5)
SODIUM SERPL-SCNC: 136 MMOL/L (ref 136–145)
STRESS BASELINE DIAS BP: 62 MMHG
STRESS BASELINE HR: 73 BPM
STRESS BASELINE SYS BP: 146 MMHG
STRESS ESTIMATED WORKLOAD: 1 METS
STRESS PEAK DIAS BP: 77 MMHG
STRESS PEAK SYS BP: 183 MMHG
STRESS PERCENT HR ACHIEVED: 74 %
STRESS POST PEAK HR: 106 BPM
STRESS RATE PRESSURE PRODUCT: NORMAL BPM*MMHG
STRESS TARGET HR: 143 BPM
TID: 1
TROPONIN T SERPL HS-MCNC: 21.2 NG/L (ref 0–14)
TROPONIN T SERPL HS-MCNC: 23 NG/L (ref 0–14)

## 2025-05-28 PROCEDURE — 93017 CV STRESS TEST TRACING ONLY: CPT

## 2025-05-28 PROCEDURE — C8929 TTE W OR WO FOL WCON,DOPPLER: HCPCS

## 2025-05-28 PROCEDURE — 6370000000 HC RX 637 (ALT 250 FOR IP): Performed by: INTERNAL MEDICINE

## 2025-05-28 PROCEDURE — 2500000003 HC RX 250 WO HCPCS: Performed by: INTERNAL MEDICINE

## 2025-05-28 PROCEDURE — 93018 CV STRESS TEST I&R ONLY: CPT | Performed by: INTERNAL MEDICINE

## 2025-05-28 PROCEDURE — 84484 ASSAY OF TROPONIN QUANT: CPT

## 2025-05-28 PROCEDURE — 93306 TTE W/DOPPLER COMPLETE: CPT | Performed by: INTERNAL MEDICINE

## 2025-05-28 PROCEDURE — 78452 HT MUSCLE IMAGE SPECT MULT: CPT

## 2025-05-28 PROCEDURE — 85730 THROMBOPLASTIN TIME PARTIAL: CPT

## 2025-05-28 PROCEDURE — 99223 1ST HOSP IP/OBS HIGH 75: CPT | Performed by: INTERNAL MEDICINE

## 2025-05-28 PROCEDURE — 3430000000 HC RX DIAGNOSTIC RADIOPHARMACEUTICAL: Performed by: PHYSICIAN ASSISTANT

## 2025-05-28 PROCEDURE — 1100000003 HC PRIVATE W/ TELEMETRY

## 2025-05-28 PROCEDURE — 96374 THER/PROPH/DIAG INJ IV PUSH: CPT

## 2025-05-28 PROCEDURE — 6360000004 HC RX CONTRAST MEDICATION: Performed by: PHYSICIAN ASSISTANT

## 2025-05-28 PROCEDURE — A9502 TC99M TETROFOSMIN: HCPCS | Performed by: PHYSICIAN ASSISTANT

## 2025-05-28 PROCEDURE — 6360000002 HC RX W HCPCS: Performed by: PHYSICIAN ASSISTANT

## 2025-05-28 PROCEDURE — 78452 HT MUSCLE IMAGE SPECT MULT: CPT | Performed by: INTERNAL MEDICINE

## 2025-05-28 PROCEDURE — 94761 N-INVAS EAR/PLS OXIMETRY MLT: CPT

## 2025-05-28 PROCEDURE — 93016 CV STRESS TEST SUPVJ ONLY: CPT | Performed by: INTERNAL MEDICINE

## 2025-05-28 PROCEDURE — 80048 BASIC METABOLIC PNL TOTAL CA: CPT

## 2025-05-28 PROCEDURE — 6360000002 HC RX W HCPCS

## 2025-05-28 RX ORDER — UMECLIDINIUM 62.5 UG/1
AEROSOL, POWDER ORAL
COMMUNITY
Start: 2025-05-12

## 2025-05-28 RX ORDER — SODIUM CHLORIDE 0.9 % (FLUSH) 0.9 %
5-40 SYRINGE (ML) INJECTION PRN
Status: DISCONTINUED | OUTPATIENT
Start: 2025-05-28 | End: 2025-05-28 | Stop reason: HOSPADM

## 2025-05-28 RX ORDER — SERTRALINE HYDROCHLORIDE 100 MG/1
100 TABLET, FILM COATED ORAL NIGHTLY
COMMUNITY

## 2025-05-28 RX ORDER — POTASSIUM CHLORIDE 1500 MG/1
40 TABLET, EXTENDED RELEASE ORAL PRN
Status: DISCONTINUED | OUTPATIENT
Start: 2025-05-28 | End: 2025-05-28 | Stop reason: HOSPADM

## 2025-05-28 RX ORDER — POLYETHYLENE GLYCOL 3350 17 G/17G
17 POWDER, FOR SOLUTION ORAL DAILY PRN
Status: DISCONTINUED | OUTPATIENT
Start: 2025-05-28 | End: 2025-05-28 | Stop reason: HOSPADM

## 2025-05-28 RX ORDER — MAGNESIUM SULFATE IN WATER 40 MG/ML
2000 INJECTION, SOLUTION INTRAVENOUS PRN
Status: DISCONTINUED | OUTPATIENT
Start: 2025-05-28 | End: 2025-05-28 | Stop reason: HOSPADM

## 2025-05-28 RX ORDER — MULTIVITAMIN WITH FOLIC ACID 400 MCG
1 TABLET ORAL EVERY MORNING
COMMUNITY

## 2025-05-28 RX ORDER — METOPROLOL TARTRATE 50 MG
25 TABLET ORAL 2 TIMES DAILY
Status: DISCONTINUED | OUTPATIENT
Start: 2025-05-28 | End: 2025-05-28 | Stop reason: HOSPADM

## 2025-05-28 RX ORDER — POTASSIUM CHLORIDE 7.45 MG/ML
10 INJECTION INTRAVENOUS PRN
Status: DISCONTINUED | OUTPATIENT
Start: 2025-05-28 | End: 2025-05-28 | Stop reason: HOSPADM

## 2025-05-28 RX ORDER — SODIUM CHLORIDE 0.9 % (FLUSH) 0.9 %
5-40 SYRINGE (ML) INJECTION EVERY 12 HOURS SCHEDULED
Status: DISCONTINUED | OUTPATIENT
Start: 2025-05-28 | End: 2025-05-28 | Stop reason: HOSPADM

## 2025-05-28 RX ORDER — METOPROLOL TARTRATE 25 MG/1
12.5 TABLET, FILM COATED ORAL 2 TIMES DAILY
Status: DISCONTINUED | OUTPATIENT
Start: 2025-05-28 | End: 2025-05-28

## 2025-05-28 RX ORDER — METOPROLOL TARTRATE 25 MG/1
TABLET, FILM COATED ORAL
COMMUNITY

## 2025-05-28 RX ORDER — ASPIRIN 81 MG/1
81 TABLET, CHEWABLE ORAL DAILY
Qty: 30 TABLET | Refills: 3 | Status: SHIPPED | OUTPATIENT
Start: 2025-05-29

## 2025-05-28 RX ORDER — SODIUM CHLORIDE 9 MG/ML
INJECTION, SOLUTION INTRAVENOUS PRN
Status: DISCONTINUED | OUTPATIENT
Start: 2025-05-28 | End: 2025-05-28 | Stop reason: HOSPADM

## 2025-05-28 RX ORDER — LISINOPRIL 5 MG/1
5 TABLET ORAL DAILY
COMMUNITY

## 2025-05-28 RX ORDER — REGADENOSON 0.08 MG/ML
0.4 INJECTION, SOLUTION INTRAVENOUS
Status: COMPLETED | OUTPATIENT
Start: 2025-05-28 | End: 2025-05-28

## 2025-05-28 RX ORDER — ONDANSETRON 2 MG/ML
4 INJECTION INTRAMUSCULAR; INTRAVENOUS EVERY 6 HOURS PRN
Status: DISCONTINUED | OUTPATIENT
Start: 2025-05-28 | End: 2025-05-28 | Stop reason: HOSPADM

## 2025-05-28 RX ORDER — ACETAMINOPHEN 325 MG/1
650 TABLET ORAL EVERY 6 HOURS PRN
Status: DISCONTINUED | OUTPATIENT
Start: 2025-05-28 | End: 2025-05-28 | Stop reason: HOSPADM

## 2025-05-28 RX ORDER — ATORVASTATIN CALCIUM 40 MG/1
40 TABLET, FILM COATED ORAL DAILY
COMMUNITY

## 2025-05-28 RX ORDER — ONDANSETRON 4 MG/1
4 TABLET, ORALLY DISINTEGRATING ORAL EVERY 8 HOURS PRN
Status: DISCONTINUED | OUTPATIENT
Start: 2025-05-28 | End: 2025-05-28 | Stop reason: HOSPADM

## 2025-05-28 RX ORDER — CLOPIDOGREL BISULFATE 75 MG/1
75 TABLET ORAL DAILY
COMMUNITY
Start: 2025-05-01

## 2025-05-28 RX ORDER — LISINOPRIL 5 MG/1
5 TABLET ORAL DAILY
Status: DISCONTINUED | OUTPATIENT
Start: 2025-05-28 | End: 2025-05-28 | Stop reason: HOSPADM

## 2025-05-28 RX ORDER — GRANULES FOR ORAL 3 G/1
3 POWDER ORAL ONCE
Status: DISCONTINUED | OUTPATIENT
Start: 2025-05-28 | End: 2025-05-28 | Stop reason: HOSPADM

## 2025-05-28 RX ORDER — ASPIRIN 81 MG/1
81 TABLET, CHEWABLE ORAL DAILY
Status: DISCONTINUED | OUTPATIENT
Start: 2025-05-29 | End: 2025-05-28 | Stop reason: HOSPADM

## 2025-05-28 RX ORDER — ACETAMINOPHEN 650 MG/1
650 SUPPOSITORY RECTAL EVERY 6 HOURS PRN
Status: DISCONTINUED | OUTPATIENT
Start: 2025-05-28 | End: 2025-05-28 | Stop reason: HOSPADM

## 2025-05-28 RX ORDER — ATORVASTATIN CALCIUM 40 MG/1
40 TABLET, FILM COATED ORAL NIGHTLY
Status: DISCONTINUED | OUTPATIENT
Start: 2025-05-28 | End: 2025-05-28 | Stop reason: HOSPADM

## 2025-05-28 RX ADMIN — SULFUR HEXAFLUORIDE 2 ML: KIT at 14:54

## 2025-05-28 RX ADMIN — LISINOPRIL 5 MG: 10 TABLET ORAL at 09:32

## 2025-05-28 RX ADMIN — HEPARIN SODIUM 1900 UNITS: 1000 INJECTION INTRAVENOUS; SUBCUTANEOUS at 06:27

## 2025-05-28 RX ADMIN — TETROFOSMIN 11 MILLICURIE: 1.38 INJECTION, POWDER, LYOPHILIZED, FOR SOLUTION INTRAVENOUS at 12:00

## 2025-05-28 RX ADMIN — HEPARIN SODIUM 3900 UNITS: 1000 INJECTION INTRAVENOUS; SUBCUTANEOUS at 00:13

## 2025-05-28 RX ADMIN — TETROFOSMIN 33 MILLICURIE: 1.38 INJECTION, POWDER, LYOPHILIZED, FOR SOLUTION INTRAVENOUS at 13:54

## 2025-05-28 RX ADMIN — SODIUM CHLORIDE, PRESERVATIVE FREE 10 ML: 5 INJECTION INTRAVENOUS at 09:32

## 2025-05-28 RX ADMIN — HEPARIN SODIUM 12 UNITS/KG/HR: 10000 INJECTION, SOLUTION INTRAVENOUS at 00:12

## 2025-05-28 RX ADMIN — METOPROLOL TARTRATE 12.5 MG: 25 TABLET, FILM COATED ORAL at 09:32

## 2025-05-28 RX ADMIN — REGADENOSON 0.4 MG: 0.08 INJECTION, SOLUTION INTRAVENOUS at 13:54

## 2025-05-28 NOTE — ED NOTES
TRANSFER - OUT REPORT:    Verbal report given to ALMA aMtos on Annette Sanchez  being transferred to [address] [place] [address] for routine progression of patient care       Report consisted of patient's Situation, Background, Assessment and   Recommendations(SBAR).     Information from the following report(s) Nurse Handoff Report, Index, ED SBAR, MAR, and Neuro Assessment was reviewed with the receiving nurse.    [place] Fall Assessment:                           Lines:   Peripheral IV 05/27/25 Right Antecubital (Active)       Peripheral IV 05/28/25 Distal;Left;Posterior Forearm (Active)        Opportunity for questions and clarification was provided.      Patient transported with:  Registered Nurse

## 2025-05-28 NOTE — ED NOTES
Pt ambulates to the bathroom. Urine provided and sent to lab. Pt does not complain of pain or trouble urinating.

## 2025-05-28 NOTE — ED NOTES
Cardiac monitor in place. Pt voiced no concerns at this time. Pt awake, alert and orientated. Allergies verified. Medicated as per MAR. Resperations even and unlabored. Call bell within reach.  Family at bedside.

## 2025-05-28 NOTE — H&P
Hospitalist Admission History and Physical    NAME:  Annette Sanchez   :   1950   MRN:   [id number]     PCP:  No primary care provider on file.  Date/Time:  2025 8:00 AM  Subjective:   CHIEF COMPLAINT:      HISTORY OF PRESENT ILLNESS:     Annette is a 75 y.o.   female who presents with chest pain that felt like a band from ner shoulders into her chest h/o 4 stents double bypass and aortic valve replacement        No past medical history on file.     No past surgical history on file.    Social History     Tobacco Use    Smoking status: Not on file    Smokeless tobacco: Not on file   Substance Use Topics    Alcohol use: Not on file        No family history on file.     Allergies   Allergen Reactions    Penicillins Anaphylaxis, Hives, Rash and Itching     Other reaction(s): Unknown    Macrobid [Nitrofurantoin] Itching    Vancomycin Itching    Codeine Nausea And Vomiting, Nausea Only and Hives        None       REVIEW OF SYSTEMS:     [] Unable to obtain  ROS due to  []mental status change  []sedated   []intubated   [x]Total of 12 systems reviewed as follows:  Constitutional:  negative fever, negative chills, negative weight loss  Eyes:   negative visual changes  ENT:   negative sore throat, tongue or lip swelling  Respiratory:  negative cough, negative dyspnea  Cards:   negative for chest pain, palpitations, lower extremity edema  GI:   negative for nausea, vomiting, diarrhea, and abdominal pain  Genitourinary: negative for frequency, dysuria  Integument:  negative for rash and pruritus  Hematologic:  negative for easy bruising and gum/nose bleeding  Musculoskel: negative for myalgias,  back pain and muscle weakness  Neurological:  negative for headaches, dizziness, vertigo  Behavl/Psych:  negative for feelings of anxiety, depression     Pertinent Positives include :    Objective:   VITALS:    BP (!) 142/39   Pulse 69   Temp 98.1 °F (36.7 °C) (Oral)   Resp 22   Ht 1.473 m (4' 10\")   Wt 64.4 kg  (142 lb)   SpO2 100%   BMI 29.68 kg/m²   Temp (24hrs), Av.1 °F (36.7 °C), Min:98.1 °F (36.7 °C), Max:98.1 °F (36.7 °C)      PHYSICAL EXAM:   General:    Alert, cooperative, no distress, appears stated age.     Head:   Normocephalic, without obvious abnormality, atraumatic.  Eyes:   Conjunctivae clear, anicteric sclerae.  Pupils are equal  Nose:  Nares normal. No drainage or sinus tenderness.  Throat:    Lips, mucosa, and tongue normal.  No Thrush  Neck:  Supple, symmetrical,  no adenopathy, thyroid: non tender    no carotid bruit and no JVD.  Back:    Symmetric,  No CVA tenderness.  Lungs:   Clear to auscultation bilaterally.  No Wheezing or Rhonchi. No rales.  Chest wall:  No tenderness or deformity. No Accessory muscle use.  Heart:   Regular rate and rhythm,  no murmur, rub or gallop.  Abdomen:   Soft, non-tender. Not distended.  Bowel sounds normal. No masses  Extremities: Extremities normal, atraumatic, No cyanosis.  No edema. No clubbing  Skin:     Texture, turgor normal. No rashes or lesions.  Not Jaundiced  Lymph nodes: Cervical, supraclavicular normal.  Psych:  Good insight.  Not depressed.  Not anxious or agitated.  Neurologic: EOMs intact. No facial asymmetry. No aphasia or slurred speech. Normal   strength, Alert and oriented X 3.       LAB DATA REVIEWED:    No components found for: \"GLPOC\"  Recent Labs     25  2125 25  0244    136   K 4.3 4.3    102   CO2 25 24   BUN 17 16   WBC 8.9  --    HGB 13.9  --    HCT 42.1  --      --          IMAGING RESULTS:   []  I have personally reviewed the actual   []CXR  []CT scan    CXR:  CT :    Assessment/Plan:      Principal Problem:    Unstable angina (HCC)  Active Problems:    Hypertension  Resolved Problems:    * No resolved hospital problems. *     ___________________________________________________  PLAN:    Risk of deterioration:  []Low    []Moderate  []High  Unstable angina  -Patient placed on heparin drip  -Cardiology

## 2025-05-28 NOTE — ED PROVIDER NOTES
EMERGENCY DEPARTMENT HISTORY AND PHYSICAL EXAM      Patient Name: Annette Sanchez  MRN: [id number]  YOB: 1950  Provider: Janet Son PA-C  PCP: No primary care provider on file.   Time/Date of evaluation: 9:51 PM EDT on 5/27/25    History of Presenting Illness     Chief Complaint   Patient presents with    Chest Pain       History Provided By: Patient     History (Narative):   Annette Sanchez is a 75 y.o. female with a PMHX of coronary artery disease, COPD, hypertensive heart disease, mixed hyperlipidemia,  who presents to the emergency department  by EMS C/O chest pain.  Patient endorses for approximately 10 minutes that she had this burning sensation in her chest.  She endorses that she felt like a band was wrapped around her upper chest and into her back.  She states that it lasted for 10 minutes, and then subsided and then she had another episode shortly afterwards.  She states that at this time she is not having any active symptoms.  She was given aspirin en route by EMS.  She denies any nausea, vomiting, fever, chills, lower extremity swelling, any orthopnea, or shortness of breath.  Patient recently did travel over 20 hours on a train from [place].  She is currently taking Plavix as prescribed.  But not on any anticoagulation      Past History     Past Medical History:  No past medical history on file.    Past Surgical History:  No past surgical history on file.    Family History:  No family history on file.    Social History:       Medications:  Current Facility-Administered Medications   Medication Dose Route Frequency Provider Last Rate Last Admin    heparin (porcine) injection 3,900 Units  60 Units/kg IntraVENous PRN Janet Son PA-C        heparin (porcine) injection 1,900 Units  30 Units/kg IntraVENous PRN Dichiara, Janet A, PA-C        heparin 25,000 units in dextrose 5% 250 mL (premix) infusion  5-30 Units/kg/hr IntraVENous Continuous DichiaraGaryJanet A PA-C 7.7

## 2025-05-28 NOTE — DISCHARGE SUMMARY
Discharge Summary    Patient: Annette Sanchez MRN: [id number]  CSN: [id number]    YOB: 1950  Age: 75 y.o.  Sex: female    DOA: 5/27/2025 LOS:  LOS: 1 day   Discharge Date: 5/28/2025      Admission Diagnosis: Unstable angina (HCC) [I20.0]  Elevated troponin [R79.89]  Chest pain, unspecified type [R07.9]    Discharge Diagnosis: Concern for unstable angina  CAD status post stenting and bypass    Discharge Condition: Stable    Discharge Disposition: Home    PHYSICAL EXAM    Visit Vitals  BP (!) 139/22   Pulse 73   Temp 98.1 °F (36.7 °C) (Oral)   Resp 20   Ht 1.473 m (4' 10\")   Wt 64.4 kg (142 lb)   SpO2 96%   BMI 29.68 kg/m²       General: Alert, cooperative, no acute distress    HEENT: NC, Atraumatic.  PERRLA, EOMI. Anicteric sclerae.  Lungs:  CTA Bilaterally. No Wheezing/Rhonchi/Rales.  Heart:  Regular  rhythm,  No murmur, No Rubs, No Gallops  Abdomen: Soft, Non distended, Non tender.  +Bowel sounds, no HSM  Extremities: No c/c/e  Psych:   Good insight. Not anxious or agitated.  Neurologic:  CN 2-12 grossly intact, oriented X 4.  No acute neurological deficits,     Hospital Course By Problem:   Patient presented to the emergency room due to chest pain that originated in right arm and then spread across the chest and left arm.  Pain resolved prior to admission however on evaluation by emergency room had mildly elevated troponins above baseline.  Given such and given patient history, patient was admitted for workup and evaluation.  Patient underwent nuclear stress test along with echocardiogram.  Echocardiogram did not show any significant abnormalities nuclear stress test did show intermediate risk.  Discussion was had with patient regarding potential ongoing inpatient ischemic evaluation versus outpatient workup.  Patient opted for outpatient workup.  Strict return precautions were given to patient and patient was subsequently discharged to home    Consults: Cardiology    Significant  inferiorly. Mild centrilobular emphysematous changes.    Chest soft tissues: Unremarkable.    Abdomen: Unremarkable.    Bones: No acute findings. Moderately pronounced chronic compressive deformity at  T8.    Impression  :    1. Negative for acute PE  -No alternative acute chest finding        Electronically signed by Gume Ren     MRI Result (most recent):  No results found for this or any previous visit from the past 3650 days.      Discharge Medications:     Discharge Medication List as of 5/28/2025  5:12 PM        START taking these medications    Details   aspirin 81 MG chewable tablet Take 1 tablet by mouth daily, Disp-30 tablet, R-3Normal           CONTINUE these medications which have NOT CHANGED    Details   atorvastatin (LIPITOR) 40 MG tablet Take 1 tablet by mouth dailyHistorical Med      clopidogrel (PLAVIX) 75 MG tablet Take 1 tablet by mouth dailyHistorical Med      lisinopril (PRINIVIL;ZESTRIL) 5 MG tablet Take 1 tablet by mouth dailyHistorical Med      metoprolol tartrate (LOPRESSOR) 25 MG tablet TAKE 1/2 TABLET TWICE A DAY BY MOUTHHistorical Med      Multiple Vitamin (MULTIVITAMIN) tablet Take 1 tablet by mouth every morningHistorical Med      sertraline (ZOLOFT) 100 MG tablet Take 1 tablet by mouth nightlyHistorical Med      INCRUSE ELLIPTA 62.5 MCG/ACT inhaler INAHLE 1 PUFF BY MOUTH ONCE A DAY, DAWHistorical Med      Calcium Carb-Cholecalciferol (CALCIUM + D3 PO) 1 tablet with a meal Orally Once a dayHistorical Med              Activity: activity as tolerated    Functional status and cognitive function:    Ambulates without assistance   Status: alert, appears stated age, and cooperative    Diet: Cardiac diet    Wound Care: none needed        Follow-up: with PCP, No primary care provider on file. in 7-10days      Minutes spent on discharge: >30 minutes spent coordinating this discharge (review instructions/follow-up, prescriptions, preparing report for sign off)

## 2025-05-28 NOTE — ED NOTES
Pt ambulates to the bathroom. Pt does not complain of pain or trouble urinating.    Labs drawn and taken to lab

## 2025-05-28 NOTE — ED NOTES
Bedside and Verbal shift change report given to Daisy RN (oncoming nurse) by Chris RN (offgoing nurse). Report included the following information Nurse Handoff Report, ED Encounter Summary, and ED SBAR.

## 2025-05-28 NOTE — ED NOTES
Attempted to [address] and give report. Was on hold greater than 10 minutes. Will attempt to call later.

## 2025-05-28 NOTE — CONSULTS
[facility] - Consult Note      Cardiology consultation request from Dr. Tellez for evaluation and management/treatment of CP, NSTEMI     Date of  Admission: 2025  9:17 PM   Primary Care Physician:  No primary care provider on file.    Attending Cardiologist: Dr. Abiodun Reynolds      Assessment:     -Chest pain, atypical.   -UTI?   -indeterminate troponin, not c/w primary ACS    -CAD s/p CABG (2V) with LIMA-LAD, SVG-RCA () with subsequent PCI   -History of severe AS s/p bioprosthetic AVR   Echo suggest mild AI, however abnormal AV Vmax: 4.1 m/sec and AV Max/Mean P/38 mmHg 2014   -HTN, on lopressor, lisinopril  -HLD, on statin   -Moderate RCA stenosis   -Emphysema      Primary cardiologist is in [place]      Plan:       I saw, evaluated, interviewed and examined the patient personally.  Patient with known history of CAD status post CABG as well as history of aortic stenosis requiring bioprosthetic valve in   Admitted after having some burning chest discomfort.  Never had this current symptoms before even before CABG  Pertinent labs reviewed.  EKGs reviewed  Echo with normal EF  Nuclear stress test images reviewed personally and finding discussed with the patient in great detail    Discussed with the patient all objective data and recommendation.  Discussed medical management, coronary evaluation with invasive angiography  Patient is visiting from [place].  Offered and recommended cardiac catheterization  After lengthy discussion, patient has elected medical management only at this time.  Recommend dual antiplatelet agent, statin, beta-blocker.  Increase dose of metoprolol 25 mg twice daily  Provide sublingual nitroglycerin  Patient understands that if she has any further symptoms how to use nitroglycerin call 911 and come back to the hospital.  Patient has elected to continue only medical management at this time      Significant time spent in reviewing the case, multiple EMR databases,

## 2025-05-28 NOTE — ED NOTES
Spoke to the provider about BP. Provider is aware of diastolic and has no new orders at this time. Provider has no concerns about pt going to the floor with the current diastolic BP.

## 2025-05-28 NOTE — ED TRIAGE NOTES
[facility] due to chest pain. Pt denies pain currently. Pt states that the pain comes and goes in waves. Pt states that when the pain comes that it is a burning sensation across the chest with some shortness of breath. EMS stated pt presented with PVCs on the EKG. EMS gave 324 aspirin in transit. EMS stated pt BP was 141/65 on arrival.   Pt hooked up to monitor.  EKG complete and given to provider.   Line placed, labs drawn and taken to lab

## 2025-05-28 NOTE — PROGRESS NOTES
completed the initial Spiritual Assessment of the patient, and offered Pastoral Car support to the patient in bed 13 of the emergency room.  Patient does not have an advance directive completed. Patient is not active in a local Gnosticist. Patient does not have any Caodaism/cultural needs that will affect patient’s preferences in health care. Chaplains will continue to follow and will provide pastoral care on an as needed/requested basis.    Spiritual Health History and Assessment/Progress Note  [facility]    Initial Encounter, Crisis (iv-sa-eje), Follow up (unstable angina),  ,      Name: Annette Sanchez MRN: [id number]    Age: 75 y.o.     Sex: female   Language: English   Yazdanism: Anabaptism   Unstable angina (HCC)     Date: 5/28/2025            Total Time Calculated: 7 min              Spiritual Assessment began in [facility] EMERGENCY DEPARTMENT        Referral/Consult From: Rounding   Encounter Overview/Reason: Initial Encounter, Crisis (iv-sa-eje)  Service Provided For: Patient    Lay, Belief, Meaning:   Patient Other: is not connected to a lay community  Family/Friends No family/friends present      Importance and Influence:  Patient has no beliefs influential to healthcare decision-making identified during this visit  Family/Friends No family/friends present    Community:  Patient feels well-supported. Support system includes: Children  Family/Friends No family/friends present    Assessment and Plan of Care:     Patient Interventions include:   Family/Friends Interventions include:     Patient Plan of Care: Spiritual Care available upon further referral  Family/Friends Plan of Care: No family/friends present    Electronically signed by Madhav Vegas Jr., [facility] on 5/28/2025 at 11:27 AM    
yes

## 2025-05-28 NOTE — ED NOTES
Bedside and Verbal shift change report received from ALMA Perez. Report included the following information Nurse Handoff Report, ED SBAR, MAR, and Recent Results.       Patient in bed awake in NAD.

## 2025-05-28 NOTE — ED NOTES
On arrival from CT pt is complaining of chest tightness and not feeling well. Pt pale and sweating. Brought pt to bedside and did repeat EKG immediately.

## 2025-05-29 LAB
BACTERIA SPEC CULT: NORMAL
CC UR VC: NORMAL
EKG ATRIAL RATE: 59 BPM
EKG ATRIAL RATE: 95 BPM
EKG DIAGNOSIS: NORMAL
EKG DIAGNOSIS: NORMAL
EKG P AXIS: 59 DEGREES
EKG P AXIS: 72 DEGREES
EKG P-R INTERVAL: 164 MS
EKG P-R INTERVAL: 202 MS
EKG Q-T INTERVAL: 338 MS
EKG Q-T INTERVAL: 404 MS
EKG QRS DURATION: 90 MS
EKG QRS DURATION: 92 MS
EKG QTC CALCULATION (BAZETT): 399 MS
EKG QTC CALCULATION (BAZETT): 424 MS
EKG R AXIS: 19 DEGREES
EKG R AXIS: 20 DEGREES
EKG T AXIS: 231 DEGREES
EKG T AXIS: 45 DEGREES
EKG VENTRICULAR RATE: 59 BPM
EKG VENTRICULAR RATE: 95 BPM
SERVICE CMNT-IMP: NORMAL

## 2025-05-29 PROCEDURE — 93010 ELECTROCARDIOGRAM REPORT: CPT | Performed by: INTERNAL MEDICINE
